# Patient Record
Sex: FEMALE
[De-identification: names, ages, dates, MRNs, and addresses within clinical notes are randomized per-mention and may not be internally consistent; named-entity substitution may affect disease eponyms.]

---

## 2022-11-21 ENCOUNTER — NON-APPOINTMENT (OUTPATIENT)
Age: 42
End: 2022-11-21

## 2023-01-13 PROBLEM — Z00.00 ENCOUNTER FOR PREVENTIVE HEALTH EXAMINATION: Status: ACTIVE | Noted: 2023-01-13

## 2023-01-17 ENCOUNTER — APPOINTMENT (OUTPATIENT)
Dept: PEDIATRIC ORTHOPEDIC SURGERY | Facility: CLINIC | Age: 43
End: 2023-01-17
Payer: COMMERCIAL

## 2023-01-17 VITALS
BODY MASS INDEX: 41.41 KG/M2 | WEIGHT: 225 LBS | TEMPERATURE: 96.9 F | DIASTOLIC BLOOD PRESSURE: 70 MMHG | HEIGHT: 62 IN | SYSTOLIC BLOOD PRESSURE: 125 MMHG

## 2023-01-17 DIAGNOSIS — Z82.69 FAMILY HISTORY OF OTHER DISEASES OF THE MUSCULOSKELETAL SYSTEM AND CONNECTIVE TISSUE: ICD-10-CM

## 2023-01-17 DIAGNOSIS — Z86.79 PERSONAL HISTORY OF OTHER DISEASES OF THE CIRCULATORY SYSTEM: ICD-10-CM

## 2023-01-17 DIAGNOSIS — Z83.3 FAMILY HISTORY OF DIABETES MELLITUS: ICD-10-CM

## 2023-01-17 DIAGNOSIS — Z78.9 OTHER SPECIFIED HEALTH STATUS: ICD-10-CM

## 2023-01-17 DIAGNOSIS — Z80.9 FAMILY HISTORY OF MALIGNANT NEOPLASM, UNSPECIFIED: ICD-10-CM

## 2023-01-17 DIAGNOSIS — M17.11 UNILATERAL PRIMARY OSTEOARTHRITIS, RIGHT KNEE: ICD-10-CM

## 2023-01-17 DIAGNOSIS — Z72.89 OTHER PROBLEMS RELATED TO LIFESTYLE: ICD-10-CM

## 2023-01-17 DIAGNOSIS — Z82.49 FAMILY HISTORY OF ISCHEMIC HEART DISEASE AND OTHER DISEASES OF THE CIRCULATORY SYSTEM: ICD-10-CM

## 2023-01-17 DIAGNOSIS — M54.16 RADICULOPATHY, LUMBAR REGION: ICD-10-CM

## 2023-01-17 DIAGNOSIS — Z82.61 FAMILY HISTORY OF ARTHRITIS: ICD-10-CM

## 2023-01-17 PROCEDURE — 72100 X-RAY EXAM L-S SPINE 2/3 VWS: CPT

## 2023-01-17 PROCEDURE — 99212 OFFICE O/P EST SF 10 MIN: CPT

## 2023-01-17 PROCEDURE — 73562 X-RAY EXAM OF KNEE 3: CPT

## 2023-01-17 RX ORDER — MELOXICAM 15 MG/1
15 TABLET ORAL
Qty: 30 | Refills: 1 | Status: ACTIVE | COMMUNITY
Start: 2023-01-17 | End: 1900-01-01

## 2023-01-17 NOTE — ASSESSMENT
[FreeTextEntry1] : Impression: Lumbar radiculitis symptomatic arthritis right knee.\par \par She will be treated with formal physical therapy along with Mobic with GI precautions.  We have also discussed the merits of an exercise program to get her weight down avoiding impact activities.  I have also suggested she continue consultation with either nutritionist or possibly her internist with regards to more aggressive modalities for weight reduction.  She will return as necessary

## 2023-01-17 NOTE — HISTORY OF PRESENT ILLNESS
[de-identified] : This 42-year-old is seen today for evaluation of back pain as well as right knee pain.  This patient has had longstanding back pain for greater than 20 years for which she states in the past she was told to have a herniated disc.  Over this past year she has had increasing episodes of discomfort with occasional radiation into the right calf.  No numbness paresthesias motor weakness bladder or bowel dysfunction.  With regards to her right knee she has had recent increase anterior knee pain with no obvious injury.  No instability locking or buckling.  Past medical history aside from back pain includes bilateral patellofemoral arthritis.  She is status post arthroscopic surgery to both knees.  There is also a history of fibromyalgia as well as polycystic ovaries for which she is on Ozempic.  Her other medications also include Cymbalta and Flexeril

## 2023-01-17 NOTE — PHYSICAL EXAM
[de-identified] : Her examination today reveals she is obese.  She has a mildly antalgic gait on the right side.  She has good motion to the lumbar spine in all planes of days with discomfort at the limits.  She has mild spasm and tenderness on both sides and midline no trigger points.  The posterior pelvic wall is nontender.  Both lower extremities are symmetric in appearance and move well at all individual joints.  Straight leg raising is uncomfortable though negative.  With regards to the right knee she has a mild effusion with no evidence of instability on stress of the cruciate/collateral ligaments.  She does have obvious pain and crepitus that emanates from the patellofemoral joint.  Negative apprehension on today's visit.  She has no significant joint line tenderness negative Steinmann.  Popliteal fossa calf and neurovascular Umer are negative.\par \par X-rays ordered and taken today of the lumbar spine and right knee reveal multilevel degenerative mild to space narrowing spondylosis.  The knee reveals significant patellofemoral arthritis mild in the medial compartment no lesion

## 2025-04-04 ENCOUNTER — NON-APPOINTMENT (OUTPATIENT)
Age: 45
End: 2025-04-04